# Patient Record
Sex: FEMALE | Race: WHITE | URBAN - METROPOLITAN AREA
[De-identification: names, ages, dates, MRNs, and addresses within clinical notes are randomized per-mention and may not be internally consistent; named-entity substitution may affect disease eponyms.]

---

## 2022-09-14 ENCOUNTER — OFFICE VISIT (OUTPATIENT)
Dept: URBAN - METROPOLITAN AREA CLINIC 3 | Facility: CLINIC | Age: 27
End: 2022-09-14
Payer: OTHER GOVERNMENT

## 2022-09-14 DIAGNOSIS — H16.042 MARGINAL CORNEAL ULCER OF LEFT EYE: Primary | ICD-10-CM

## 2022-09-14 PROCEDURE — 92002 INTRM OPH EXAM NEW PATIENT: CPT | Performed by: OPHTHALMOLOGY

## 2022-09-14 RX ORDER — VALACYCLOVIR 500 MG/1
500 MG TABLET, FILM COATED ORAL
Qty: 30 | Refills: 0 | Status: ACTIVE
Start: 2022-09-14

## 2022-09-14 RX ORDER — MOXIFLOXACIN 5 MG/ML
0.5 % SOLUTION/ DROPS OPHTHALMIC
Qty: 5 | Refills: 0 | Status: ACTIVE
Start: 2022-09-14

## 2022-09-14 RX ORDER — ERYTHROMYCIN 5 MG/G
OINTMENT OPHTHALMIC
Qty: 3.5 | Refills: 0 | Status: ACTIVE
Start: 2022-09-14

## 2022-09-14 ASSESSMENT — INTRAOCULAR PRESSURE
OS: 13
OD: 10

## 2022-09-14 NOTE — IMPRESSION/PLAN
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 Plan: Possibly bacterial or viral CL related corneal infection. Start CL holiday. Start Moxifloxacin QID OS and erythromycin victor manuel QHS OS, and Valacyclovir 500mg TID for 10 days.

## 2022-09-19 ENCOUNTER — OFFICE VISIT (OUTPATIENT)
Dept: URBAN - METROPOLITAN AREA CLINIC 3 | Facility: CLINIC | Age: 27
End: 2022-09-19
Payer: OTHER GOVERNMENT

## 2022-09-19 DIAGNOSIS — H16.042 MARGINAL CORNEAL ULCER OF LEFT EYE: Primary | ICD-10-CM

## 2022-09-19 PROCEDURE — 99212 OFFICE O/P EST SF 10 MIN: CPT | Performed by: OPTOMETRIST

## 2022-09-19 ASSESSMENT — INTRAOCULAR PRESSURE
OD: 14
OS: 14

## 2022-09-19 NOTE — IMPRESSION/PLAN
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 Plan: Resolving. Continue Moxifloxacin QID OS and erythromycin victor manuel QHS OS, and Valacyclovir 500mg TID PO.

## 2022-09-26 ENCOUNTER — OFFICE VISIT (OUTPATIENT)
Dept: URBAN - METROPOLITAN AREA CLINIC 3 | Facility: CLINIC | Age: 27
End: 2022-09-26
Payer: OTHER GOVERNMENT

## 2022-09-26 DIAGNOSIS — H16.042 MARGINAL CORNEAL ULCER OF LEFT EYE: Primary | ICD-10-CM

## 2022-09-26 PROCEDURE — 99212 OFFICE O/P EST SF 10 MIN: CPT | Performed by: OPTOMETRIST

## 2022-09-26 ASSESSMENT — INTRAOCULAR PRESSURE
OD: 18
OS: 17

## 2022-09-26 NOTE — IMPRESSION/PLAN
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 Plan: Resolved. Stop Moxifloxacin and erythromycin victor manuel, and Valacyclovir. PT can resume CL use.